# Patient Record
Sex: MALE | Race: BLACK OR AFRICAN AMERICAN | Employment: FULL TIME | URBAN - METROPOLITAN AREA
[De-identification: names, ages, dates, MRNs, and addresses within clinical notes are randomized per-mention and may not be internally consistent; named-entity substitution may affect disease eponyms.]

---

## 2021-09-04 ENCOUNTER — HOSPITAL ENCOUNTER (OUTPATIENT)
Dept: GENERAL RADIOLOGY | Age: 51
Discharge: HOME | End: 2021-09-04
Payer: COMMERCIAL

## 2021-09-04 DIAGNOSIS — M25.511 RIGHT SHOULDER PAIN: ICD-10-CM

## 2021-09-04 DIAGNOSIS — M25.552 LEFT HIP PAIN: ICD-10-CM

## 2021-09-04 DIAGNOSIS — M54.50 LOW BACK PAIN: ICD-10-CM

## 2021-09-04 DIAGNOSIS — M25.562 LEFT KNEE PAIN: ICD-10-CM

## 2021-09-04 DIAGNOSIS — M25.572 LEFT ANKLE PAIN: ICD-10-CM

## 2021-09-04 DIAGNOSIS — M79.672 LEFT FOOT PAIN: ICD-10-CM

## 2021-09-04 PROCEDURE — 73564 X-RAY EXAM KNEE 4 OR MORE: CPT

## 2021-09-04 PROCEDURE — 73630 X-RAY EXAM OF FOOT: CPT

## 2021-09-04 PROCEDURE — 72110 X-RAY EXAM L-2 SPINE 4/>VWS: CPT

## 2021-09-04 PROCEDURE — 73502 X-RAY EXAM HIP UNI 2-3 VIEWS: CPT

## 2021-09-04 PROCEDURE — 73030 X-RAY EXAM OF SHOULDER: CPT

## 2021-09-04 PROCEDURE — 73610 X-RAY EXAM OF ANKLE: CPT

## 2024-01-13 ENCOUNTER — APPOINTMENT (OUTPATIENT)
Facility: HOSPITAL | Age: 54
End: 2024-01-13
Payer: COMMERCIAL

## 2024-01-13 ENCOUNTER — HOSPITAL ENCOUNTER (EMERGENCY)
Facility: HOSPITAL | Age: 54
Discharge: HOME | End: 2024-01-13
Payer: COMMERCIAL

## 2024-01-13 VITALS
OXYGEN SATURATION: 100 % | HEART RATE: 87 BPM | TEMPERATURE: 97.3 F | HEIGHT: 77 IN | SYSTOLIC BLOOD PRESSURE: 140 MMHG | BODY MASS INDEX: 21.84 KG/M2 | RESPIRATION RATE: 14 BRPM | WEIGHT: 185 LBS | DIASTOLIC BLOOD PRESSURE: 83 MMHG

## 2024-01-13 DIAGNOSIS — S61.210A LACERATION OF RIGHT INDEX FINGER W/O FOREIGN BODY W/O DAMAGE TO NAIL, INITIAL ENCOUNTER: Primary | ICD-10-CM

## 2024-01-13 DIAGNOSIS — S61.212A LACERATION OF RIGHT MIDDLE FINGER W/O FOREIGN BODY W/O DAMAGE TO NAIL, INITIAL ENCOUNTER: ICD-10-CM

## 2024-01-13 PROCEDURE — 12032 INTMD RPR S/A/T/EXT 2.6-7.5: CPT

## 2024-01-13 PROCEDURE — 12042 INTMD RPR N-HF/GENIT2.6-7.5: CPT

## 2024-01-13 PROCEDURE — 73130 X-RAY EXAM OF HAND: CPT

## 2024-01-13 PROCEDURE — 99283 EMERGENCY DEPT VISIT LOW MDM: CPT

## 2024-01-13 NOTE — ED TRIAGE NOTES
Pt ambulated to triage. C/C 2 1 inch lacerations to right hand in one on the index and one on middle finger. Pt states he was using a knife to cut something and it slipped. Bleeding is controlled with gauze and coban att. Pt not on blood thinners.     Last tetanus 1 year ago.

## 2024-01-13 NOTE — ED PROVIDER NOTES
[facility] EMERGENCY DEPT  EMERGENCY DEPARTMENT ENCOUNTER       Pt Name: Bang Ojeda  MRN: [id number]  Birthdate 1970  Date of evaluation: 1/13/2024  PCP: Gael Arteaga MD  Note Started: 9:02 PM 1/13/24     CHIEF COMPLAINT       Chief Complaint   Patient presents with    Laceration        HISTORY OF PRESENT ILLNESS: 1 or more elements      History From: Patient  HPI Limitations: None  Chronic Conditions: none  Social Determinants affecting Dx or Tx: none      Bang Ojeda is a 53 y.o. male who presents to ED c/o laceration to right index and right middle finger. Pt was using knife when he cut fingers. Bleeding controlled with pressure. Pt denies numbness, weakness, reduced ROM. Last Tetanus one year ago.      Nursing Notes were all reviewed and agreed with or any disagreements were addressed in the HPI.    PAST HISTORY     Past Medical History:  No past medical history on file.    Past Surgical History:  No past surgical history on file.    Family History:  No family history on file.    Social History:  Social History     Socioeconomic History    Marital status: Single       Allergies:  No Known Allergies    CURRENT MEDICATIONS      No current facility-administered medications for this encounter.     No current outpatient medications on file.          PHYSICAL EXAM      Vitals:    01/13/24 1302   BP: (!) 140/83   Pulse: 87   Resp: 14   Temp: 97.3 °F (36.3 °C)   SpO2: 100%   Weight: 83.9 kg (185 lb)   Height: 1.956 m (6' 5\")     Physical Exam  Vitals and nursing note reviewed.   Constitutional:       Appearance: Normal appearance.      Comments: AA male in NAD. Alert.    HENT:      Head: Normocephalic.      Right Ear: External ear normal.      Left Ear: External ear normal.   Eyes:      Conjunctiva/sclera: Conjunctivae normal.   Cardiovascular:      Rate and Rhythm: Normal rate and regular rhythm.      Pulses:           Radial pulses are 2+ on the right side and 2+ on the left side.      Heart sounds: Normal  method:  Local infiltration    Local anesthetic:  Lidocaine 1% w/o epi  Laceration details:     Location: right second and third finger.    Length (cm):  6  Pre-procedure details:     Preparation:  Patient was prepped and draped in usual sterile fashion and imaging obtained to evaluate for foreign bodies  Exploration:     Limited defect created (wound extended): no      Hemostasis achieved with:  Direct pressure    Imaging obtained: x-ray      Imaging outcome: foreign body not noted      Wound exploration: wound explored through full range of motion      Wound extent: no foreign bodies/material noted, no muscle damage noted, no nerve damage noted, no tendon damage noted and no underlying fracture noted      Contaminated: no    Treatment:     Area cleansed with:  Babak    Amount of cleaning:  Extensive    Irrigation solution:  Sterile saline    Irrigation volume:  50    Irrigation method:  Syringe    Visualized foreign bodies/material removed: no      Debridement:  None    Undermining:  None    Scar revision: no    Skin repair:     Repair method:  Sutures    Suture size:  4-0    Suture material:  Nylon    Suture technique:  Simple interrupted    Number of sutures:  10  Approximation:     Approximation:  Close  Repair type:     Repair type:  Intermediate  Post-procedure details:     Dressing:  Splint for protection, antibiotic ointment and non-adherent dressing    Procedure completion:  Tolerated           CRITICAL CARE TIME       EMERGENCY DEPARTMENT COURSE and DIFFERENTIAL DIAGNOSIS/MDM   Vitals:    Vitals:    01/13/24 1302   BP: (!) 140/83   Pulse: 87   Resp: 14   Temp: 97.3 °F (36.3 °C)   SpO2: 100%   Weight: 83.9 kg (185 lb)   Height: 1.956 m (6' 5\")       Patient was given the following medications:  Medications - No data to display        Records Reviewed (source and summary): Nursing notes.        ED COURSE       Medial Decision Making:  DDX: laceration to right second digit and right third digit. Will  Yes

## 2024-01-24 ENCOUNTER — HOSPITAL ENCOUNTER (EMERGENCY)
Facility: HOSPITAL | Age: 54
Discharge: HOME | End: 2024-01-24

## 2024-01-24 VITALS
HEART RATE: 82 BPM | RESPIRATION RATE: 18 BRPM | DIASTOLIC BLOOD PRESSURE: 60 MMHG | OXYGEN SATURATION: 99 % | TEMPERATURE: 98.1 F | SYSTOLIC BLOOD PRESSURE: 107 MMHG

## 2024-01-24 DIAGNOSIS — Z48.02 VISIT FOR SUTURE REMOVAL: Primary | ICD-10-CM

## 2024-01-24 NOTE — ED PROVIDER NOTES
doing well with no complication.  There is been no drainage, pain.  He has has noticed some continued swelling of his index finger with no pain.    I have reviewed all PMHX, FMHX and Social Hx as entered into the medical record in the chart below using the Epic Template.    Review of Systems:  Constitutional: neg for fever, chills  ENT:  neg for URI symptoms  Respiratory:  neg for cough, shortness of breath  Cardiovascular:  neg for chest pain  GI:  neg for abdominal pain.  :  No urinary symptoms. No Flank pain.  MSK: neg for back pain. Neg for extremity pain.   Integumentary: no rashes, positive for laceration   Neurological: neg for headaches  All other systems reviewed negative with exception of positives in ROS and HPI.    Past Medical History:  History reviewed. No pertinent past medical history.    Past Surgical History:  History reviewed. No pertinent surgical history.    Family History:  History reviewed. No pertinent family history.    Social History:       Allergies:  No Known Allergies    Vital Signs:  Vitals:    01/24/24 0931   BP: 107/60   Pulse: 82   Resp: 18   Temp: 98.1 °F (36.7 °C)   SpO2: 99%     Physical Exam:  Vital Signs Reviewed. Nursing Notes Reviewed.  Constitutional:  Well developed, well nourished patient. Appearance and behavior are age and situation appropriate.  Head: Normocephalic, Atraumatic   Eyes: Conjunctiva clear, lids normal. Sclera anicteric.   ENT:hearing grossly intact  Neck:  supple, FROM  Lungs: No respiratory distress.   Extremities:  good ROM, nontender with palpation.   Neuro:  A&O x 3. CN II-XII grossly intact.No gross neuro deficits.  Skin:  Warm, dry, no rash. Laceration appears to be healing well, right base index, middle fingers. No redness, warmth, tenderness, discharge. Skin intact (other than laceration noted)       Diagnostics:    Labs -   No results found for this or any previous visit (from the past 12 hour(s)).    EKG: When ordered, EKG's are interpreted by

## 2024-01-24 NOTE — ED NOTES
Discharge instructions reviewed with pt who verbalized understanding of all instructions and ambulated out of department with steady gait.    Lazy S Complex Repair Preamble Text (Leave Blank If You Do Not Want): Extensive wide undermining was performed.

## 2024-01-24 NOTE — ED TRIAGE NOTES
Pt presents to ER Ambulatory with sutures to R hand between index and middle finger. Pt reports sutures placed on 13 Jan.